# Patient Record
Sex: MALE | Race: WHITE | NOT HISPANIC OR LATINO | Employment: UNEMPLOYED | URBAN - NONMETROPOLITAN AREA
[De-identification: names, ages, dates, MRNs, and addresses within clinical notes are randomized per-mention and may not be internally consistent; named-entity substitution may affect disease eponyms.]

---

## 2017-11-16 ENCOUNTER — OFFICE VISIT (OUTPATIENT)
Dept: FAMILY MEDICINE CLINIC | Facility: CLINIC | Age: 32
End: 2017-11-16

## 2017-11-16 VITALS
SYSTOLIC BLOOD PRESSURE: 120 MMHG | HEART RATE: 84 BPM | OXYGEN SATURATION: 99 % | BODY MASS INDEX: 26.34 KG/M2 | TEMPERATURE: 99 F | WEIGHT: 184 LBS | HEIGHT: 70 IN | DIASTOLIC BLOOD PRESSURE: 78 MMHG

## 2017-11-16 DIAGNOSIS — L02.619 CELLULITIS AND ABSCESS OF FOOT: ICD-10-CM

## 2017-11-16 DIAGNOSIS — L03.119 CELLULITIS AND ABSCESS OF FOOT: ICD-10-CM

## 2017-11-16 DIAGNOSIS — R09.89: ICD-10-CM

## 2017-11-16 DIAGNOSIS — F19.90 ILLICIT DRUG USE: ICD-10-CM

## 2017-11-16 DIAGNOSIS — Z23 NEED FOR TDAP VACCINATION: ICD-10-CM

## 2017-11-16 LAB
ALBUMIN SERPL-MCNC: 4.4 G/DL (ref 3.5–5)
ALBUMIN/GLOB SERPL: 1.3 G/DL (ref 1–2)
ALP SERPL-CCNC: 86 U/L (ref 38–126)
ALT SERPL-CCNC: 33 U/L (ref 13–69)
AST SERPL-CCNC: 21 U/L (ref 15–46)
BILIRUB SERPL-MCNC: 0.2 MG/DL (ref 0.2–1.3)
BUN SERPL-MCNC: 8 MG/DL (ref 7–20)
BUN/CREAT SERPL: 8 (ref 6.3–21.9)
CALCIUM SERPL-MCNC: 9.9 MG/DL (ref 8.4–10.2)
CHLORIDE SERPL-SCNC: 104 MMOL/L (ref 98–107)
CO2 SERPL-SCNC: 24 MMOL/L (ref 26–30)
CREAT SERPL-MCNC: 1 MG/DL (ref 0.6–1.3)
ERYTHROCYTE [DISTWIDTH] IN BLOOD BY AUTOMATED COUNT: 12.2 % (ref 11.5–14.5)
GFR SERPLBLD CREATININE-BSD FMLA CKD-EPI: 105 ML/MIN/1.73
GFR SERPLBLD CREATININE-BSD FMLA CKD-EPI: 87 ML/MIN/1.73
GLOBULIN SER CALC-MCNC: 3.5 GM/DL
GLUCOSE SERPL-MCNC: 99 MG/DL (ref 74–98)
HCT VFR BLD AUTO: 45.2 % (ref 42–52)
HGB BLD-MCNC: 15.1 G/DL (ref 14–18)
MCH RBC QN AUTO: 30.8 PG (ref 27–31)
MCHC RBC AUTO-ENTMCNC: 33.4 G/DL (ref 30–37)
MCV RBC AUTO: 92.2 FL (ref 80–94)
PLATELET # BLD AUTO: 438 10*3/MM3 (ref 130–400)
POTASSIUM SERPL-SCNC: 5.1 MMOL/L (ref 3.5–5.1)
PROT SERPL-MCNC: 7.9 G/DL (ref 6.3–8.2)
RBC # BLD AUTO: 4.9 10*6/MM3 (ref 4.7–6.1)
SODIUM SERPL-SCNC: 142 MMOL/L (ref 137–145)
WBC # BLD AUTO: 11.43 10*3/MM3 (ref 4.8–10.8)

## 2017-11-16 PROCEDURE — 90715 TDAP VACCINE 7 YRS/> IM: CPT | Performed by: NURSE PRACTITIONER

## 2017-11-16 PROCEDURE — 99204 OFFICE O/P NEW MOD 45 MIN: CPT | Performed by: NURSE PRACTITIONER

## 2017-11-16 PROCEDURE — 90471 IMMUNIZATION ADMIN: CPT | Performed by: NURSE PRACTITIONER

## 2017-11-16 PROCEDURE — 96372 THER/PROPH/DIAG INJ SC/IM: CPT | Performed by: NURSE PRACTITIONER

## 2017-11-16 RX ORDER — CLINDAMYCIN HYDROCHLORIDE 300 MG/1
300 CAPSULE ORAL 3 TIMES DAILY
Qty: 30 CAPSULE | Refills: 0 | Status: SHIPPED | OUTPATIENT
Start: 2017-11-16 | End: 2017-11-26

## 2017-11-16 NOTE — PROGRESS NOTES
"Subjective   Dillon Li is a 32 y.o. male.     HPI Comments: Dillon is a 32 year old male here today to establish care and for treatment of a left foot abscess. He has swelling and redness of his left foot that started about 3-4 months ago with a \"bump\" that was causing him pain.  At that time he went to the ER and received antibiotics for it.  He also punctured the wound himself and drained and cleaned it.  He says that at that time it went away.  Now the pain and edema is back starting about 3 days ago.  It is difficult to walk on.      His past medical history includes depression and GERD that have resolved and do not require medication at this time.    He denies alcohol or illicit drug use.  He does dip, has since he was a teen.   He reports that he does eat healthy and gets regular exercise.  He walks a lot.       The following portions of the patient's history were reviewed and updated as appropriate: allergies, current medications, past family history, past medical history, past social history, past surgical history and problem list.    Review of Systems   Constitutional: Negative.    HENT: Negative.    Eyes: Negative.    Respiratory: Negative for apnea, cough, chest tightness, shortness of breath and wheezing.    Gastrointestinal: Negative.    Endocrine: Negative.    Genitourinary: Negative.    Musculoskeletal: Positive for gait problem. Negative for arthralgias, back pain, joint swelling, myalgias, neck pain and neck stiffness.   Skin: Positive for color change and wound. Negative for pallor and rash.   Allergic/Immunologic: Negative.    Hematological: Negative.    Psychiatric/Behavioral: Negative.      Blood pressure 120/78, pulse 84, temperature 99 °F (37.2 °C), height 70\" (177.8 cm), weight 184 lb (83.5 kg), SpO2 99 %.  Objective   Physical Exam   Constitutional: He is oriented to person, place, and time. He appears well-developed and well-nourished. No distress.   HENT:   Head: Normocephalic. "   Right Ear: External ear normal.   Left Ear: External ear normal.   Nose: Nose normal.   Mouth/Throat: Oropharynx is clear and moist.   Eyes: Conjunctivae are normal. Pupils are equal, round, and reactive to light. No scleral icterus.   Neck: Normal range of motion. Neck supple. No tracheal deviation present. No thyromegaly present.   Cardiovascular: Normal rate, regular rhythm, normal heart sounds and intact distal pulses.    No murmur heard.  Pulmonary/Chest: Effort normal and breath sounds normal. No respiratory distress. He has no wheezes. He has no rales. He exhibits no tenderness.   Abdominal: Soft. Bowel sounds are normal. He exhibits no distension and no mass. There is no hepatosplenomegaly or splenomegaly. There is no tenderness. There is no rebound and no guarding. No hernia.   Musculoskeletal: Normal range of motion. He exhibits no edema or tenderness.   Pain with movement of toes on left foot        Lymphadenopathy:     He has no cervical adenopathy.        Right cervical: No superficial cervical, no deep cervical and no posterior cervical adenopathy present.       Left cervical: No superficial cervical, no deep cervical and no posterior cervical adenopathy present.   Neurological: He is alert and oriented to person, place, and time. Coordination and gait normal.   Skin: Skin is warm, dry and intact. No rash noted. He is not diaphoretic. There is erythema. No cyanosis. No pallor. Nails show no clubbing.   Old and fresh track marks noted in bilateral antecubital areas   Psychiatric: He has a normal mood and affect. His behavior is normal. Judgment and thought content normal.   Nursing note and vitals reviewed.      Assessment/Plan   Dillon was seen today for establish care.    Diagnoses and all orders for this visit:    Cellulitis and abscess of left foot with toes  -     CBC (No Diff)  -     Comprehensive Metabolic Panel  -     XR Foot 3+ View Left; Future  -     clindamycin (CLEOCIN) 300 MG capsule;  Take 1 capsule by mouth 3 (Three) Times a Day for 10 days.  -     penicillin G benzathine (BICILLIN-LA) injection 1.2 Million Units; Inject 2 mL into the shoulder, thigh, or buttocks 1 (One) Time.    Venous track marks  -     Hepatitis C Antibody  -     Hepatitis B Virus Profile    Suspected Illicit drug use   -     Hepatitis C Antibody  -     Hepatitis B Virus Profile    Need for Tdap vaccination  -     Tdap Vaccine Greater Than or Equal To 8yo IM      New Medications Ordered This Visit   Medications   • clindamycin (CLEOCIN) 300 MG capsule     Sig: Take 1 capsule by mouth 3 (Three) Times a Day for 10 days.     Dispense:  30 capsule     Refill:  0   • penicillin G benzathine (BICILLIN-LA) injection 1.2 Million Units     Bicillin injection given in the clinic today and a prescription sent in for Clindamycin every 8 hours. Patient was advised to start his antibiotic today and take it as directed until complete, with food. A screening CBC and CMP was ordered in the clinic today, and a Xray of his left foot as well, for suspected necrotizing fasciitis or bone involvement. The foot cellulitis and abscess is suspected to be a result of a needle injection, since patient had many old and new track marks visible on bilateral arms. Patient denied illegal drug use though. I will contact patient regarding test results and provide instructions regarding any necessary changes in plan of care.    A UDS was collected today in the clinic and will be sent for analysis.     His Tdap vaccine was updated today.     Patient advised to take Tylenol and Motrin for his pain. Will consider other pain medication after drug screen results are available.     Patient was encouraged to keep me informed of any acute changes, lack of improvement, or any new concerning symptoms. Patient advised to also go to the ED for treatment if symptoms worsen of fever develops. Patient voiced understanding of all instructions and denied further  questions.    Patient to RTC Monday.

## 2017-11-17 ENCOUNTER — TELEPHONE (OUTPATIENT)
Dept: FAMILY MEDICINE CLINIC | Facility: CLINIC | Age: 32
End: 2017-11-17

## 2017-11-17 NOTE — TELEPHONE ENCOUNTER
----- Message from KIM Pagan sent at 11/17/2017  1:25 PM EST -----  His Xray just shows some swelling in the tissue but nothing in the bone, so I will see how his foot looks Monday and decide on a MRI.

## 2017-11-17 NOTE — TELEPHONE ENCOUNTER
----- Message from KIM Pagan sent at 11/17/2017  1:07 PM EST -----  His WBC count is up so make sure he takes all of his abx.

## 2017-11-17 NOTE — TELEPHONE ENCOUNTER
Pt mother notified and she said that she was going to take him to the ER because his foot looked worse

## 2017-11-21 LAB
HCV AB S/CO SERPL IA: 0.2 S/CO RATIO (ref 0–0.9)
Lab: NORMAL
WRITTEN AUTHORIZATION: NORMAL

## 2017-11-27 ENCOUNTER — TELEPHONE (OUTPATIENT)
Dept: FAMILY MEDICINE CLINIC | Facility: CLINIC | Age: 32
End: 2017-11-27

## 2017-11-28 NOTE — TELEPHONE ENCOUNTER
----- Message from KIM Pagan sent at 11/21/2017 12:36 PM EST -----  Let him know he will not be able to get any pain meds bc his drug screen showed he had Hydrocodone and Oxycodone in it and he told me he had not taken anything.